# Patient Record
Sex: FEMALE | Race: WHITE | NOT HISPANIC OR LATINO | Employment: FULL TIME | URBAN - METROPOLITAN AREA
[De-identification: names, ages, dates, MRNs, and addresses within clinical notes are randomized per-mention and may not be internally consistent; named-entity substitution may affect disease eponyms.]

---

## 2018-06-08 ENCOUNTER — APPOINTMENT (EMERGENCY)
Dept: RADIOLOGY | Facility: HOSPITAL | Age: 48
End: 2018-06-08
Payer: COMMERCIAL

## 2018-06-08 ENCOUNTER — HOSPITAL ENCOUNTER (EMERGENCY)
Facility: HOSPITAL | Age: 48
Discharge: HOME | End: 2018-06-08
Admitting: EMERGENCY MEDICINE
Payer: COMMERCIAL

## 2018-06-08 VITALS
OXYGEN SATURATION: 100 % | SYSTOLIC BLOOD PRESSURE: 175 MMHG | RESPIRATION RATE: 18 BRPM | WEIGHT: 219.36 LBS | HEART RATE: 72 BPM | TEMPERATURE: 98.1 F | DIASTOLIC BLOOD PRESSURE: 88 MMHG

## 2018-06-08 DIAGNOSIS — S52.122A FRACTURE OF RADIAL HEAD, LEFT, CLOSED: ICD-10-CM

## 2018-06-08 DIAGNOSIS — M25.422 ELBOW EFFUSION, LEFT: Primary | ICD-10-CM

## 2018-06-08 PROCEDURE — 99283 EMERGENCY DEPT VISIT LOW MDM: CPT

## 2018-06-08 PROCEDURE — 73080 X-RAY EXAM OF ELBOW: CPT

## 2018-06-08 RX ORDER — NAPROXEN 500 MG/1
500 TABLET ORAL 2 TIMES DAILY WITH MEALS
Qty: 14 TABLET | Refills: 0 | Status: SHIPPED | OUTPATIENT
Start: 2018-06-08

## 2018-06-08 RX ORDER — TRAMADOL HYDROCHLORIDE 50 MG/1
50 TABLET ORAL EVERY 6 HOURS PRN
Qty: 12 TABLET | Refills: 0 | Status: SHIPPED | OUTPATIENT
Start: 2018-06-08 | End: 2018-06-20

## 2018-06-08 NOTE — ED NOTES
Sling applied by Madalynn Saint, ice pack refilled upon PT request      Fela Vieira RN  06/08/18 9258

## 2018-06-08 NOTE — DISCHARGE INSTRUCTIONS
Elbow Fracture   WHAT YOU NEED TO KNOW:   An elbow fracture is a break in one or more of the 3 bones that form your elbow joint  An elbow fracture is often caused by an injury  An example is a fall onto an outstretched hand with a bent elbow  Osteoporosis (brittle bones) can increase your risk for an elbow fracture  DISCHARGE INSTRUCTIONS:   Return to the emergency department if:   · Your skin becomes swollen, cold, or pale  · Your elbow, hand, or fingers are numb  Contact your healthcare provider if:   · You have a fever  · The pain gets worse, even after you rest and take your medicine  · You have new or more trouble moving your arm  · You have new sores around the area of your brace, splint, or cast     · Your brace, splint, or cast becomes damaged  · You have questions or concerns about your condition or care  Medicines: You may need any of the following:  · Prescription pain medicine  may be given  Ask your healthcare provider how to take this medicine safely  Some prescription pain medicines contain acetaminophen  Do not take other medicines that contain acetaminophen without talking to your healthcare provider  Too much acetaminophen may cause liver damage  Prescription pain medicine may cause constipation  Ask your healthcare provider how to prevent or treat constipation  · NSAIDs , such as ibuprofen, help decrease swelling, pain, and fever  This medicine is available with or without a doctor's order  NSAIDs can cause stomach bleeding or kidney problems in certain people  If you take blood thinner medicine, always ask your healthcare provider if NSAIDs are safe for you  Always read the medicine label and follow directions  · Take your medicine as directed  Contact your healthcare provider if you think your medicine is not helping or if you have side effects  Tell him or her if you are allergic to any medicine  Keep a list of the medicines, vitamins, and herbs you take   Include the amounts, and when and why you take them  Bring the list or the pill bottles to follow-up visits  Carry your medicine list with you in case of an emergency  Self-care:   · Elevate your elbow  above the level of your heart as often as you can  This will help decrease swelling and pain  Prop your elbow on pillows or blankets to keep it elevated comfortably  While your elbow is elevated, wiggle your fingers and open and close them to prevent hand stiffness  · Apply ice  on your elbow on your elbow for 15 to 20 minutes every hour or as directed  Use an ice pack, or put crushed ice in a plastic bag  Cover it with a towel  Ice helps prevent tissue damage and decreases swelling and pain  · Go to physical therapy as directed  A physical therapist can teach you exercises to help improve movement and strength and to decrease pain  Care for your brace, cast, or splint:  Follow instructions about when you may take a bath or shower  It is important not to get your brace, cast, or splint wet  Cover your device with a plastic bag before you bathe  Tape the bag to your skin above the device to help keep out water  Hold your elbow away from the water in case the bag breaks  · Check the skin around your cast, brace, or splint daily for any redness or open skin  · Do not use a sharp or pointed object to scratch your skin under the cast, brace, or splint  · Do not remove your brace or splint unless directed  Follow up with your healthcare provider as directed: You may need to see a specialist  Rafaela Blanco may need more x-rays and treatment  Write down your questions so you remember to ask them during your visits  © 2017 [employer] Information is for End User's use only and may not be sold, redistributed or otherwise used for commercial purposes  All illustrations and images included in CareNotes® are the copyrighted property of [employer]  or [employer]    The above information is an  only  It is not intended as medical advice for individual conditions or treatments  Talk to your doctor, nurse or pharmacist before following any medical regimen to see if it is safe and effective for you

## 2018-06-08 NOTE — ED PROVIDER NOTES
History  Chief Complaint   Patient presents with    Fall     Pt  s/p fall, slipped and fell and broke fall by falling onto left arm  Pt  now with pain from left upper arm,left elbow to wrist  Pt  took advill for pain at 915     Patient presents emergency room after slipping and falling onto both outstretched hands  She complains of pain over her left forearm and elbow  She states the pain is worse with the range of motion  She has no history of a previous injury  She denies any head or neck injury  She denies any numbness, tingling but does have weakness in her left arm secondary to pain and guarding  She denies any other injuries  Patient took ibuprofen prior to arrival   She has a past medical history that is negative  History provided by:  Patient  Fall   Mechanism of injury: fall    Injury location: left elbow  Incident location:  Home  Fall:     Fall occurred:  Standing (slipped anf fell landing on her left outsretched hand )    Impact surface:  Hard floor    Point of impact:  Hands  Suspicion of alcohol use: no    Suspicion of drug use: no    Prior to arrival data:     Loss of consciousness: no      Amnesic to event: no    Associated symptoms: no abdominal pain, no back pain, no chest pain, no headaches and no neck pain        None       History reviewed  No pertinent past medical history  History reviewed  No pertinent surgical history  History reviewed  No pertinent family history  I have reviewed and agree with the history as documented  Social History   Substance Use Topics    Smoking status: Never Smoker    Smokeless tobacco: Never Used    Alcohol use No      Comment: social        Review of Systems   Constitutional: Positive for activity change  Cardiovascular: Negative for chest pain  Gastrointestinal: Negative for abdominal pain  Musculoskeletal: Positive for arthralgias  Negative for back pain, gait problem, joint swelling, neck pain and neck stiffness  Neurological: Negative for headaches  Psychiatric/Behavioral: Negative for confusion  All other systems reviewed and are negative  Physical Exam  Physical Exam   Constitutional: She is oriented to person, place, and time  She appears well-developed and well-nourished  No distress  HENT:   Head: Normocephalic and atraumatic  Right Ear: External ear normal    Left Ear: External ear normal    Nose: Nose normal    Eyes: Conjunctivae are normal  Right eye exhibits no discharge  Left eye exhibits no discharge  Pulmonary/Chest: Effort normal    Musculoskeletal: She exhibits tenderness  She exhibits no deformity  Examination of the left elbow-it is atraumatic upon inspection  Patient is guarding her arm  There is tenderness palpated over the radial head that is increased with pronation supination of her forearm  The remainder of the forearm wrist and hand are nontender with full range of motion  There is no tenderness of the shoulder or proximal humerus area  There is no cervical spine tenderness  There are palpable pulses over the radial and ulnar arteries that are +2 and symmetrical at the wrist   Capillary refills less than 2 sec  Motor and sensation are intact to the median, Radial, ulnar, axillary aspects of the left upper extremity   Neurological: She is alert and oriented to person, place, and time  Skin: Skin is warm  Capillary refill takes less than 2 seconds  She is not diaphoretic  Psychiatric: She has a normal mood and affect  Her behavior is normal  Judgment and thought content normal    Nursing note and vitals reviewed        Vital Signs  ED Triage Vitals [06/08/18 1034]   Temperature Pulse Respirations Blood Pressure SpO2   98 1 °F (36 7 °C) 72 18 (!) 175/88 100 %      Temp Source Heart Rate Source Patient Position - Orthostatic VS BP Location FiO2 (%)   Oral Monitor Lying Right arm --      Pain Score       8           Vitals:    06/08/18 1034   BP: (!) 175/88   Pulse: 72   Patient Position - Orthostatic VS: Lying       Visual Acuity      ED Medications  Medications - No data to display    Diagnostic Studies  Results Reviewed     None                 XR elbow 3+ vw LEFT   ED Interpretation by Ashtyn Gutiérrez PA-C (06/08 1126)   Effusion, fracture radial head  Final Result by Marcin Salomon MD (06/08 1134)      Fracture of the proximal radius  Workstation performed: [id number]                    Procedures  Procedures       Phone Contacts  ED Phone Contact    ED Course                               MDM  Number of Diagnoses or Management Options  Elbow effusion, left: new and requires workup  Fracture of radial head, left, closed: new and requires workup     Amount and/or Complexity of Data Reviewed  Tests in the radiology section of CPT®: ordered and reviewed  Tests in the medicine section of CPT®: ordered and reviewed    Risk of Complications, Morbidity, and/or Mortality  Presenting problems: moderate  Diagnostic procedures: moderate  Management options: moderate  General comments: Patient presents emergency room after falling and landing on her left outstretched hand  She was seen and evaluated and diagnosed with x-ray examination with a nondisplaced radial head fracture  She had an anterior and posterior fusion in or elbow as well  She was placed in a sling for comfort    She was given pain medications and an orthopedic referral     Patient Progress  Patient progress: stable    CritCare Time    Disposition  Final diagnoses:   Elbow effusion, left   Fracture of radial head, left, closed     Time reflects when diagnosis was documented in both MDM as applicable and the Disposition within this note     Time User Action Codes Description Comment    6/8/2018 11:27 AM Oziel Gutierrez Add [M25 422] Elbow effusion, left     6/8/2018 11:27 AM Oziel Gutierrez Add [S52 122A] Fracture of radial head, left, closed       ED Disposition     ED Disposition Condition Comment Discharge  [facility]  Condition at discharge: Good        Follow-up Information     Follow up With Specialties Details Why Contact Info    Quan Lake MD Orthopedic Surgery Schedule an appointment as soon as possible for a visit in 3 days  [address] [place] [address] [address]  [address] [address]            Discharge Medication List as of 6/8/2018 11:30 AM      START taking these medications    Details   naproxen (NAPROSYN) 500 mg tablet Take 1 tablet (500 mg total) by mouth 2 (two) times a day with meals, Starting Fri 6/8/2018, Print      traMADol (ULTRAM) 50 mg tablet Take 1 tablet (50 mg total) by mouth every 6 (six) hours as needed for moderate pain for up to 12 days, Starting Fri 6/8/2018, Until Wed 6/20/2018, Print           No discharge procedures on file      ED Provider  Electronically Signed by           Tony Feliciano PA-C  06/08/18 4776

## 2018-06-08 NOTE — ED NOTES
PT awake and alert, no distress noted  No other questions upon d/c       April Ross Jackson RN  06/08/18 7913

## 2018-06-13 ENCOUNTER — OFFICE VISIT (OUTPATIENT)
Dept: OBGYN CLINIC | Facility: CLINIC | Age: 48
End: 2018-06-13
Payer: COMMERCIAL

## 2018-06-13 VITALS
DIASTOLIC BLOOD PRESSURE: 81 MMHG | BODY MASS INDEX: 37.56 KG/M2 | HEART RATE: 79 BPM | SYSTOLIC BLOOD PRESSURE: 124 MMHG | WEIGHT: 220 LBS | HEIGHT: 64 IN

## 2018-06-13 DIAGNOSIS — S52.125A CLOSED NONDISPLACED FRACTURE OF HEAD OF LEFT RADIUS, INITIAL ENCOUNTER: Primary | ICD-10-CM

## 2018-06-13 PROBLEM — G47.33 OSA (OBSTRUCTIVE SLEEP APNEA): Status: ACTIVE | Noted: 2018-02-23

## 2018-06-13 PROBLEM — E66.9 CLASS 2 OBESITY WITH BODY MASS INDEX (BMI) OF 37.0 TO 37.9 IN ADULT: Status: ACTIVE | Noted: 2018-03-05

## 2018-06-13 PROBLEM — K63.5 COLON POLYPS: Status: ACTIVE | Noted: 2018-06-13

## 2018-06-13 PROBLEM — R73.01 IMPAIRED FASTING GLUCOSE: Status: ACTIVE | Noted: 2018-06-13

## 2018-06-13 PROCEDURE — 99204 OFFICE O/P NEW MOD 45 MIN: CPT | Performed by: ORTHOPAEDIC SURGERY

## 2018-06-13 PROCEDURE — 24650 CLTX RDL HEAD/NCK FX WO MNPJ: CPT | Performed by: ORTHOPAEDIC SURGERY

## 2018-06-13 RX ORDER — FLUTICASONE PROPIONATE 50 MCG
SPRAY, SUSPENSION (ML) NASAL
COMMUNITY
Start: 2017-01-18

## 2018-06-13 RX ORDER — LISINOPRIL 20 MG/1
TABLET ORAL
COMMUNITY
Start: 2018-06-01

## 2018-06-13 RX ORDER — ESCITALOPRAM OXALATE 20 MG/1
TABLET ORAL
COMMUNITY
Start: 2018-03-21

## 2018-06-13 NOTE — PROGRESS NOTES
Patient Name:  Anne Marie Cadet  MRN:  [id number]    Assessment & Plan    Left radial head/neck fracture 6/8/18  1  Continue nonweightbearing left upper extremity  2  Continue sling use at all times for total of two weeks from the injury date  Wean from sling gradually for the following two weeks  3  Referral to physical therapy  4  Follow-up in three and half weeks for repeat evaluation with repeat x-rays of the left elbow      Chief Complaint    Left elbow injury      History of the Present Illness    [place] female, RHD,  reports to the office today for evaluation of her left elbow  Patient states she sustained a mechanical fall on 6/8/18 with outstretched arms  After the fall she noted significant left elbow pain swelling and bruising  She reported to the emergency department her left elbow x-rays revealed a radial head/neck fracture  She was placed in a slight that time  Today in the office she notes overall improvement with regards to her pain  She notes no pain at rest   She denies numbness and tingling  She does note swelling and stiffness in the hand and digits  No fevers or chills  Physical Exam    LMP 06/04/2018     Left elbow:  No gross deformity  Skin intact  No erythema  There is swelling and ecchymosis noted about the elbow with extension distally into the hand and digits  Tenderness to palpation radial head  No tenderness to palpation medial and lateral epicondyle and olecranon  Range of motion includes 100° of elbow flexion  She lacks approximately 45° of extension  Pronation and supination intact but limited by pain  Wrist and digital range of motion intact  Sensation intact median ulnar and radial nerves  2+ radial pulse    Constitutional:  Well-developed and well-nourished  Eyes:  Anicteric sclerae  Neck:  Supple  Lungs:  Unlabored breathing  Cardiovascular:  Capillary refill is less than 2 seconds  Skin:  Intact without erythema    Neurologic:  Sensation intact to light touch  Psychiatric:  Mood and affect are appropriate  Data Review    I have personally reviewed pertinent films in PACS, and my interpretation follows  X-rays left elbow performed 6/8/18 reveals an impacted nondisplaced fracture of the radial head/neck  Fracture / Dislocation Treatment  Date/Time: 6/13/2018 3:26 PM  Performed by: Martha Manual by: Glory Mosqueda   Injury location: elbow  Location details: left elbow  Injury type: fracture  Fracture type: radial head  Immobilization: sling            History reviewed  No pertinent past medical history  History reviewed  No pertinent surgical history  Allergies   Allergen Reactions    Codeine        Current Outpatient Prescriptions on File Prior to Visit   Medication Sig Dispense Refill    naproxen (NAPROSYN) 500 mg tablet Take 1 tablet (500 mg total) by mouth 2 (two) times a day with meals 14 tablet 0    traMADol (ULTRAM) 50 mg tablet Take 1 tablet (50 mg total) by mouth every 6 (six) hours as needed for moderate pain for up to 12 days 12 tablet 0     No current facility-administered medications on file prior to visit  Social History   Substance Use Topics    Smoking status: Never Smoker    Smokeless tobacco: Never Used    Alcohol use No      Comment: social       History reviewed  No pertinent family history  Review of Systems    General:  Negative for fever, lethargy/malaise, or night sweats  Eyes:  Negative for blurry vision or double vision  ENT:  Negative for hearing change, nasal discharge, or sore throat  Hematological:  Negative for bleeding problems or blood clots  Endocrine:  Negative for excessive thirst or temperature intolerance  Respiratory:  Negative for cough or wheezing  Cardiovascular:  Negative for chest pain, dyspnea on exertion, or palpitations  Gastrointestinal:  Negative for abdominal pain, diarrhea, or nausea/vomiting    Musculoskeletal:  As stated in the HPI and otherwise negative  Neurological:  Negative for confusion, headaches, or seizures  Psychological:  Negative for hallucinations or mood swings  Dermatological:  Negative for itching or rash        Scribe Attestation    I,:   Jermaine Valiente PA-C am acting as a scribe while in the presence of the attending physician :        I,:   June Penn MD personally performed the services described in this documentation    as scribed in my presence :

## 2018-06-25 ENCOUNTER — EVALUATION (OUTPATIENT)
Dept: PHYSICAL THERAPY | Facility: CLINIC | Age: 48
End: 2018-06-25
Payer: COMMERCIAL

## 2018-06-25 DIAGNOSIS — S52.125A CLOSED NONDISPLACED FRACTURE OF HEAD OF LEFT RADIUS, INITIAL ENCOUNTER: Primary | ICD-10-CM

## 2018-06-25 PROCEDURE — G8984 CARRY CURRENT STATUS: HCPCS | Performed by: PHYSICAL THERAPIST

## 2018-06-25 PROCEDURE — 97162 PT EVAL MOD COMPLEX 30 MIN: CPT | Performed by: PHYSICAL THERAPIST

## 2018-06-25 PROCEDURE — G8985 CARRY GOAL STATUS: HCPCS | Performed by: PHYSICAL THERAPIST

## 2018-06-25 NOTE — PROGRESS NOTES
Daily Note     Today's date: 2018  Patient name: Yarely Moore  : 1970  MRN: [id number]  Referring provider: Morales Mcfarlane MD  Dx:   Encounter Diagnosis   Name Primary?  Closed nondisplaced fracture of head of left radius, initial encounter Yes                  Subjective: See IE      Objective: See treatment diary below      Assessment: Tolerated session well with good tolerance to extension PROM/AAROM  Supination most painful      Plan: Continue with current plan  Goal is to return to work (typing quite a lot) and to active-cardio class      Precautions: NWB, radial head fracture , HTN    Daily Treatment Diary       Manuals          PROM          STM to lateral extensor mass                                         Exercise Diary          Pendulums 20x         AAROM hand assist elbow flex 10x :05         AAROM wrist flex/ext 2x10         AAROM supination light 5x          Shoulder flexion AROM 10x         Table slides-flex/abd          ER wand- in neutral pron/sup          Full fist          Pulleys if natasha                                                                                                                                  Modalities          CP prn

## 2018-06-25 NOTE — PROGRESS NOTES
PT Evaluation     Today's date: 2018  Patient name: Bruna Montes  : 1970  MRN: [id number]  Referring provider: Des Kingsley MD  Dx:   Encounter Diagnosis     ICD-10-CM    1  Closed nondisplaced fracture of head of left radius, initial encounter S52 125A Ambulatory referral to Physical Therapy                  Assessment  Impairments: abnormal or restricted ROM, abnormal movement, activity intolerance and pain with function    Assessment details: Bruna Montes is a pleasant 41 y o  female who presents with signs and symptoms correlating with referring diagnosis  No further referral appears necessary at this time based upon examination results  The patient's greatest concerns are returning to work and exercise activities  she presents with a movement impairment diagnosis of proximal radioulnar supination hypomobility  Which presents with decreased ROM, strength, myofascial tension and pain which is limiting her ability to lift, type, and perform exercise activities  Negative prognostic indicators:none  Positive prognostic indicators: good motivation  Please contact me if you have any further questions or recommendations  Thank you very much for the kind referral         Goals  STGs  1  Decrease pain by 30% in 2-4 weeks  2  Improve elbow ROM by 10 degrees in 2-4 weeks  3  Improve elbow/wrist strength by 1/3 grade in 2-4 weeks  4 Perform gym "active" exercises in 4 weeks s pain  LTGs  1  Decrease pain by 60% in 6-8 weeks  2  Improve lifting tolerance to #3 in 6-8 weeks  3  Perform job typing activities without pain in 6-8 weeks  4 Independent in HEP in 8 weeks      Plan  Patient would benefit from: skilled physical therapy  Planned therapy interventions: manual therapy, therapeutic training, stretching, strengthening, therapeutic activities, therapeutic exercise, patient education and activity modification  Frequency: 2x week  Duration in weeks: 8  Treatment plan discussed with: patient        Subjective Evaluation    History of Present Illness  Mechanism of injury: She states she was emptying the  and she slipped went face down and braced herself with both arms  She went to the ER and was diagnosed with radial head fracture  She denies any numbness or tingling  She states it does feel tender and feels better sling  She works for [employer] and is on short term disability  She does sleep with the sling  She enjoys working out doing HIIT classes     Pain  At best pain ratin  At worst pain ratin  Location: lateral elbow    Social Support  Lives with: spouse    Hand dominance: right      Diagnostic Tests  X-ray: abnormal  Patient Goals  Patient goals for therapy: decreased pain, increased motion and increased strength          Objective     Active Range of Motion     Left Elbow   Flexion: 98 degrees   Extension: 30 degrees     Right Elbow   Forearm supination: 80 degrees   Forearm pronation: 87 degrees     Additional Active Range of Motion Details    Wrist:  74 ext pain  65 flex pain  30 Ulnar dev pain  15 Radial dev  Wrist strength:   Flex: 3+/5 pain  Ext:3+/5 pain    Shoulder: Repeated flexion pain in elbow after 3 reps (~150)  Abd: ~150 pain  ER: painful when in supination    Palpation: TTP at supinator muscle belly    Passive Range of Motion     Left Elbow   Flexion: 100 degrees with pain  Extension: 42 degrees with pain  Forearm supination: 50 degrees with pain  Forearm pronation: 70 degrees

## 2018-06-27 ENCOUNTER — OFFICE VISIT (OUTPATIENT)
Dept: PHYSICAL THERAPY | Facility: CLINIC | Age: 48
End: 2018-06-27
Payer: COMMERCIAL

## 2018-06-27 DIAGNOSIS — S52.125A CLOSED NONDISPLACED FRACTURE OF HEAD OF LEFT RADIUS, INITIAL ENCOUNTER: Primary | ICD-10-CM

## 2018-06-27 PROCEDURE — 97112 NEUROMUSCULAR REEDUCATION: CPT

## 2018-06-27 PROCEDURE — 97110 THERAPEUTIC EXERCISES: CPT

## 2018-06-27 PROCEDURE — 97140 MANUAL THERAPY 1/> REGIONS: CPT

## 2018-06-27 NOTE — PROGRESS NOTES
Daily Note     Today's date: 2018  Patient name: Shahana Her  : 1970  MRN: [id number]  Referring provider: Divya Patten MD  Dx:   Encounter Diagnosis     ICD-10-CM    1  Closed nondisplaced fracture of head of left radius, initial encounter S52 125A                   Subjective: Patient states that she has been compliant with her HEP but feels moderate amount of pain when performing AAROM elbow flexion  Objective: See treatment diary below    Precautions: NWB, radial head fracture , HTN    Daily Treatment Diary       Manuals         PROM  10        STM to lateral extensor mass  5                                       Exercise Diary          Pendulums 20x 20x        AAROM hand assist elbow flex 10x :05 pain        AAROM wrist flex/ext  2x10        AAROM supination light  5x        Shoulder flexion AROM  NV        Table slides-flex/abd  :10x10         ER wand- in neutral pron/sup          Full fist x20 x20        Pulleys if natasha  2 min                                                                                                                                 Modalities          CP prn                        Assessment: Tolerated treatment well  Patient exhibited good technique with therapeutic exercises      Plan: Continue per plan of care

## 2018-07-02 ENCOUNTER — APPOINTMENT (OUTPATIENT)
Dept: PHYSICAL THERAPY | Facility: CLINIC | Age: 48
End: 2018-07-02
Payer: COMMERCIAL

## 2018-07-03 ENCOUNTER — TELEPHONE (OUTPATIENT)
Dept: OBGYN CLINIC | Facility: HOSPITAL | Age: 48
End: 2018-07-03

## 2018-07-03 ENCOUNTER — APPOINTMENT (OUTPATIENT)
Dept: RADIOLOGY | Facility: CLINIC | Age: 48
End: 2018-07-03
Payer: COMMERCIAL

## 2018-07-03 ENCOUNTER — OFFICE VISIT (OUTPATIENT)
Dept: OBGYN CLINIC | Facility: CLINIC | Age: 48
End: 2018-07-03

## 2018-07-03 VITALS
HEIGHT: 64 IN | BODY MASS INDEX: 37.56 KG/M2 | HEART RATE: 74 BPM | SYSTOLIC BLOOD PRESSURE: 136 MMHG | DIASTOLIC BLOOD PRESSURE: 84 MMHG | WEIGHT: 220 LBS

## 2018-07-03 DIAGNOSIS — M25.522 PAIN IN LEFT ELBOW: Primary | ICD-10-CM

## 2018-07-03 DIAGNOSIS — S52.125D CLOSED NONDISPLACED FRACTURE OF HEAD OF LEFT RADIUS WITH ROUTINE HEALING, SUBSEQUENT ENCOUNTER: ICD-10-CM

## 2018-07-03 DIAGNOSIS — M25.522 PAIN IN LEFT ELBOW: ICD-10-CM

## 2018-07-03 PROCEDURE — 99024 POSTOP FOLLOW-UP VISIT: CPT | Performed by: ORTHOPAEDIC SURGERY

## 2018-07-03 PROCEDURE — 73080 X-RAY EXAM OF ELBOW: CPT

## 2018-07-03 NOTE — PROGRESS NOTES
41 y o female presents to the office roughly 4 weeks status post left radial head/neck fracture on 06/08/2018 due to a fall  She has been progressing well with physical therapy  She is still working on extension  She has been compliant with her lifting restrictions and her sling  She has no new complaints or concerns today in the office  Review of Systems  Review of systems negative unless otherwise specified in HPI    Past Medical History  Past Medical History:   Diagnosis Date    Anxiety     Hypertension        Past Surgical History  No past surgical history on file  Current Medications  Current Outpatient Prescriptions on File Prior to Visit   Medication Sig Dispense Refill    Cholecalciferol 4000 units CAPS Take one tab by mouth daily for depression & Vit D deficiency      escitalopram (LEXAPRO) 20 mg tablet       fluticasone (FLONASE) 50 mcg/act nasal spray USE 2 SPRAYS IN EACH NOSTRIL ONCE DAILY      lisinopril (ZESTRIL) 20 mg tablet       naproxen (NAPROSYN) 500 mg tablet Take 1 tablet (500 mg total) by mouth 2 (two) times a day with meals 14 tablet 0     No current facility-administered medications on file prior to visit  Recent Labs (HCT,HGB,PT,INR,ESR,CRP,GLU,HgA1C)  No results found for: HCT, HGB, WBC, PT, INR, ESR, CRP, GLUCOSE, HGBA1C      Physical exam  · General: Awake, Alert, Oriented  · Eyes: Pupils equal, round and reactive to light  · Heart: regular rate and rhythm  · Lungs: No audible wheezing  · Abdomen: soft  Left elbow  · Tender to palpation globally  · 15 degrees from full extension  · Mild effusion  · No ecchymosis   · Skin is warm and well perfused  · Good capillary refill      Imaging  X-ray of left elbow was reviewed and shows routine healing of radial head fracture    Procedure  None    Assessment/Plan:   41 y  o female is 4 weeks status post left radial head fracture    · Transition out of sling  · Continue physical therapy  · Work note was provided to patient stating she can return to sedentary work with lifting restrictions and an emphasis on the importance of going to physical therapy  · Follow up 1 month

## 2018-07-03 NOTE — LETTER
July 3, 2018     Patient: Emanuel Perez   YOB: 1970   Date of Visit: 7/3/2018       To Whom it May Concern:    Emanuel Perez is under my professional care  She was seen in my office on 7/3/2018  She is cleared to return to work modified duty from 7/3/18 until 8/3/18:  Sedentary duty with maximum lifting 2 pounds with the left upper extremity  Please allow patient to leave work to attend physical therapy 2-3 times a week, which is critical for restoring optimal range of motion and function in the injured extremity  If you have any questions or concerns, please don't hesitate to call           Sincerely,          Ian Valdez MD

## 2018-07-03 NOTE — TELEPHONE ENCOUNTER
Patient sees Dr Caryle Pais   [phone]    Patient is calling in reference to her return to work note  Patient is stating that her employer is not accommodating at all & she works about an hour & 20 minutes to get to pt from work  Patient is concerned that they will not allow this  Patient is wondering if she could return to work on 7/23/18 instead of 7/3/18 so that she can continue her pt

## 2018-07-05 ENCOUNTER — OFFICE VISIT (OUTPATIENT)
Dept: PHYSICAL THERAPY | Facility: CLINIC | Age: 48
End: 2018-07-05
Payer: COMMERCIAL

## 2018-07-05 DIAGNOSIS — S52.125A CLOSED NONDISPLACED FRACTURE OF HEAD OF LEFT RADIUS, INITIAL ENCOUNTER: Primary | ICD-10-CM

## 2018-07-05 PROCEDURE — 97110 THERAPEUTIC EXERCISES: CPT | Performed by: PHYSICAL THERAPIST

## 2018-07-05 NOTE — PROGRESS NOTES
Daily Note     Today's date: 2018  Patient name: Ifrah Ibrahim  : 1970  MRN: [id number]  Referring provider: Quiana Dixon MD  Dx:   Encounter Diagnosis     ICD-10-CM    1  Closed nondisplaced fracture of head of left radius, initial encounter S52 125A                   Subjective: Patient states she got back to her active class without using LUE    Objective: See treatment diary below     Precautions: NWB, #1 lifting radial head fracture , HTN    Daily Treatment Diary       Manuals  7       PROM  10 10'       STM to lateral extensor mass  5 3'                                      Exercise Diary          Pendulums 20x 20x 20x       AAROM hand assist elbow flex 10x :05 pain 10x       AAROM wrist flex/ext  2x10 2x10       AAROM supination light  5x 10x       Shoulder flexion AROM  NV 2x10       Table slides-flex/abd  :10x10  :10x10       ER wand- in neutral pron/sup   :05 x10       Full fist x20 x20 20x       Pulleys if natasha  2 min  3'       Elbow flex #1          AROM shoulder abd   2x10                                                                                                           Modalities          CP prn   10'                     Assessment: Tolerated treatment well  Patient exhibited good technique with therapeutic exercises  Reached out to MD for updated precautions and he stated NWB status still and she is only allowed to lift #1  Add in nv  Pain with end ROM elbow ext/flex  Plan: Continue per plan of care  Goal is to RTW on  as well as kayaking

## 2018-07-06 ENCOUNTER — OFFICE VISIT (OUTPATIENT)
Dept: PHYSICAL THERAPY | Facility: CLINIC | Age: 48
End: 2018-07-06
Payer: COMMERCIAL

## 2018-07-06 DIAGNOSIS — S52.125A CLOSED NONDISPLACED FRACTURE OF HEAD OF LEFT RADIUS, INITIAL ENCOUNTER: Primary | ICD-10-CM

## 2018-07-06 PROCEDURE — 97140 MANUAL THERAPY 1/> REGIONS: CPT | Performed by: PHYSICAL THERAPIST

## 2018-07-06 PROCEDURE — 97110 THERAPEUTIC EXERCISES: CPT | Performed by: PHYSICAL THERAPIST

## 2018-07-09 NOTE — TELEPHONE ENCOUNTER
I called and spoke to Rafael Garcia and let her know that the work note is completed  She will pick it up at the office

## 2018-07-10 ENCOUNTER — OFFICE VISIT (OUTPATIENT)
Dept: PHYSICAL THERAPY | Facility: CLINIC | Age: 48
End: 2018-07-10
Payer: COMMERCIAL

## 2018-07-10 DIAGNOSIS — S52.125A CLOSED NONDISPLACED FRACTURE OF HEAD OF LEFT RADIUS, INITIAL ENCOUNTER: Primary | ICD-10-CM

## 2018-07-10 PROCEDURE — 97110 THERAPEUTIC EXERCISES: CPT | Performed by: PHYSICAL THERAPIST

## 2018-07-10 NOTE — PROGRESS NOTES
Daily Note     Today's date: 7/10/2018  Patient name: Juan Rodriguez  : 1970  MRN: [id number]  Referring provider: Som Srivastava MD  Dx:   Encounter Diagnosis     ICD-10-CM    1  Closed nondisplaced fracture of head of left radius, initial encounter S52 125A                   Subjective: Pt presents today stating a little tight but otherwise feeling pretty well  Objective: See treatment diary below     Precautions: NWB, #1 lifting radial head fracture , HTN     Daily Treatment Diary       Manuals  7/10     PROM  10 10' 5 min 5'     STM to lateral extensor mass  5 3' 5 mins 7''                                    Exercise Diary          Pendulums 20x 20x 20x 20x np     AROM  elbow flex 10x :05 pain 10x 10x  2x10     AROM wrist flex/ext  2x10 2x10 2 x 10 2x10     AROM supination   5x 10x 10x  2x10     Shoulder flexion AROM  NV 2x10 2 x 10 2x10     Table slides-flex/abd  :10x10  :10x10 10" x 10 10x :10     ER wand- in neutral pron/sup   :05 x10 5" x 10 5x :10     Full, hook,  fist x20 x20 20x 20x  20x     Pulleys if natasha  2 min  3'  3 mins 3'      Elbow flex #1    1# x 20 #1 2x10 painful     AROM shoulder abd   2x10 1# x 20 #1 x20     Prone Ys/Ts     2x10     Russian twists     2x20                                                                                     Modalities          CP prn   10'  10'                   Assessment: She tolerated all TE well other than end range flexion and extension ROM      Plan: Continue per plan of care

## 2018-07-12 ENCOUNTER — OFFICE VISIT (OUTPATIENT)
Dept: PHYSICAL THERAPY | Facility: CLINIC | Age: 48
End: 2018-07-12
Payer: COMMERCIAL

## 2018-07-12 DIAGNOSIS — S52.125A CLOSED NONDISPLACED FRACTURE OF HEAD OF LEFT RADIUS, INITIAL ENCOUNTER: Primary | ICD-10-CM

## 2018-07-12 PROCEDURE — 97140 MANUAL THERAPY 1/> REGIONS: CPT | Performed by: PHYSICAL THERAPIST

## 2018-07-12 PROCEDURE — 97110 THERAPEUTIC EXERCISES: CPT | Performed by: PHYSICAL THERAPIST

## 2018-07-12 NOTE — PROGRESS NOTES
Daily Note     Today's date: 2018  Patient name: Debra Roman  : 1970  MRN: [id number]  Referring provider: Jose Delcid MD  Dx:   Encounter Diagnosis     ICD-10-CM    1  Closed nondisplaced fracture of head of left radius, initial encounter S52 125A                   Subjective: Pt presents today stating a little tight but otherwise feeling pretty well  Objective: See treatment diary below     Precautions: NWB, #1 lifting radial head fracture , HTN     Daily Treatment Diary       Manuals 6/25 6/27 7/5 7/7 7/10 7/12    PROM  10 10' 5 min 5' 5'    STM to lateral extensor mass  5 3' 5 mins 7'' 5'    Resisted L scap retraction/depression      5'                         Exercise Diary          Pendulums 20x 20x 20x 20x np 20x    AROM  elbow flex 10x :05 pain 10x 10x  2x10 2x10    Gripping putty- nv          AROM supination   5x 10x 10x  2x10 2x10    Shoulder flexion AROM  NV 2x10 2 x 10 2x10 2x10     Table slides-flex/abd  :10x10  :10x10 10" x 10 10x :10     ER wand- in neutral pron/sup   :05 x10 5" x 10 5x :10     Full, hook,  fist x20 x20 20x 20x  20x 20x    Pulleys if natasha  2 min  3'  3 mins 3'  3'    Elbow flex #1    1# x 20 #1 2x10 painful     AROM shoulder abd   2x10 1# x 20 #1 x20 #1 x20    Prone Ys/Ts     2x10 2x10    Russian twists     2x20 2x20    TB wall walks                                                                                Modalities          CP prn   10'  10' 10'                  Assessment: She still noticed clicking in shoulder but was made better after scapular strengthening  Still lacks elbow end range extension  Plan: Continue per plan of care

## 2018-07-15 NOTE — PROGRESS NOTES
Daily Note     Today's date: 2018  Patient name: Jai Hendrickson  : 1970  MRN: [id number]  Referring provider: Zulay Booker MD  Dx:   Encounter Diagnosis     ICD-10-CM    1  Closed nondisplaced fracture of head of left radius, initial encounter S52 125A                   Subjective: Pt presents today stating a little tight but otherwise feeling pretty well  Objective: See treatment diary below     Precautions: NWB, #1 lifting radial head fracture , HTN     Daily Treatment Diary       Manuals 6/25 6/27 7/5 7/7 7/10 7/12 7/17   PROM  10 10' 5 min 5' 5' 5'   STM to lateral extensor mass  5 3' 5 mins 7'' 5' 5'   Resisted L scap retraction/depression      5' np                        Exercise Diary          Pendulums 20x 20x 20x 20x np 20x    AROM  elbow flex 10x :05 pain 10x 10x  2x10 2x10 2x10   Gripping putty- nv          AROM supination   5x 10x 10x  2x10 2x10 2x10   Shoulder flexion AROM  NV 2x10 2 x 10 2x10 2x10  2x10 #1   Table slides-flex/abd  :10x10  :10x10 10" x 10 10x :10     ER wand- in neutral pron/sup   :05 x10 5" x 10 5x :10     Full, hook,  fist x20 x20 20x 20x  20x 20x 20x   Pulleys if natasha  2 min  3'  3 mins 3'  3' 3'/3'   Elbow flex AROM    1# x 20 #1 2x10 painful  2x10   AROM shoulder abd   2x10 1# x 20 #1 x20 #1 x20 #1 x20   Prone Ys/Ts     2x10 2x10 2x10   Russian twists     2x20 2x20 3x 20   TB wall walks       7x                                                                         Modalities          CP prn   10'  10' 10'                  Assessment: Still lacks elbow end range extension, flexion PROM had some resistance to stretch  Plan: Continue per plan of care  Will go to 1x/week until she follows up with MD on

## 2018-07-17 ENCOUNTER — OFFICE VISIT (OUTPATIENT)
Dept: PHYSICAL THERAPY | Facility: CLINIC | Age: 48
End: 2018-07-17
Payer: COMMERCIAL

## 2018-07-17 DIAGNOSIS — S52.125A CLOSED NONDISPLACED FRACTURE OF HEAD OF LEFT RADIUS, INITIAL ENCOUNTER: Primary | ICD-10-CM

## 2018-07-17 PROCEDURE — 97110 THERAPEUTIC EXERCISES: CPT | Performed by: PHYSICAL THERAPIST

## 2018-07-17 PROCEDURE — 97140 MANUAL THERAPY 1/> REGIONS: CPT | Performed by: PHYSICAL THERAPIST

## 2018-07-19 ENCOUNTER — APPOINTMENT (OUTPATIENT)
Dept: PHYSICAL THERAPY | Facility: CLINIC | Age: 48
End: 2018-07-19
Payer: COMMERCIAL

## 2018-07-23 NOTE — PROGRESS NOTES
PT Re-Evaluation     Today's date: 2018  Patient name: Elizabeth Fierro  : 1970  MRN: [id number]  Referring provider: Leopold Mura, MD  Dx:   No diagnosis found  Assessment  Impairments: abnormal or restricted ROM, abnormal movement, activity intolerance and pain with function    Assessment details: Dimple Vigil presents with great improvements of ROM and elbow strength  She does have residual pain at end ROM flex/ext/supination  She has been performing exercises at gym with WB activities such as modified pushups which she was cautioned against but has been improving greatly s pain  She still does have decreased ext ROM, ext strength, shoulder flex strength myofascial tension and pain which is limiting her ability to perform her exercise activities and lifting at home which include kayaking and lifting gallons of water  Thank you very much for the kind referral         Goals  STGs  1  Decrease pain by 30% in 2-4 weeks  -met  2  Improve elbow ROM to full in 2-4 weeks  -met   3  Improve elbow/wrist strength by 1/3 grade in 2-4 weeks  -met  4  Perform gym "active" exercises in 4 weeks s pain -met    LTGs  1  Decrease pain by 60% in 6-8 weeks  2  Improve lifting tolerance to #3 in 6-8 weeks  -partially met  3  Perform job typing activities without pain in 6-8 weeks  -met   4  Independent in HEP in 8 weeks  Plan  Patient would benefit from: skilled physical therapy  Planned therapy interventions: manual therapy, therapeutic training, stretching, strengthening, therapeutic activities, therapeutic exercise, patient education and activity modification  Frequency: 2x week  Duration in weeks: 8  Treatment plan discussed with: patient        Subjective Evaluation    History of Present Illness  Mechanism of injury: She states she has been doing a lot better  She is still having difficulty and pain with cleans  #10 and modified pushups  She has gotten back to work and 4x/week at exercise classes  Pain  No pain reported  At best pain ratin  At worst pain ratin  Location: posterior elbow    Social Support  Lives with: spouse    Hand dominance: right      Diagnostic Tests  X-ray: abnormal  Patient Goals  Patient goals for therapy: decreased pain, increased motion and increased strength          Objective     Active Range of Motion     Left Elbow   Flexion: 135 (tightness) degrees with pain  Extension: 2 (clicking) degrees with pain    Right Elbow   Forearm supination: 80 degrees   Forearm pronation: 87 degrees     Additional Active Range of Motion Details    Wrist:  ROM WNL    Wrist strength:   Flex: 4+/5   Ext:4+/5     Shoulder: Repeated flexion 170  Abd: 170   ER: WNL  ER strength:     Palpation: TTP at supinator muscle belly    Passive Range of Motion     Left Elbow   Flexion: 135 degrees with pain  Extension: 2 degrees

## 2018-07-23 NOTE — PROGRESS NOTES
Daily Note     Today's date: 2018  Patient name: Christelle Santiago  : 1970  MRN: [id number]  Referring provider: aDve Saenz MD  Dx:   No diagnosis found  Subjective: Pt presents today stating a little tight but otherwise feeling pretty well  Objective: See treatment diary below     Precautions: NWB, #1 lifting radial head fracture , HTN     Daily Treatment Diary       Manuals 6/25 6/27 7/5 7/7 7/10 7/12 7/17   PROM  10 10' 5 min 5' 5' 5'   STM to lateral extensor mass  5 3' 5 mins 7'' 5' 5'   Resisted L scap retraction/depression      5' np                        Exercise Diary          Pendulums 20x 20x 20x 20x np 20x    AROM  elbow flex 10x :05 pain 10x 10x  2x10 2x10 2x10   Gripping putty- nv          AROM supination   5x 10x 10x  2x10 2x10 2x10   Shoulder flexion AROM  NV 2x10 2 x 10 2x10 2x10  2x10 #1   Table slides-flex/abd  :10x10  :10x10 10" x 10 10x :10     ER wand- in neutral pron/sup   :05 x10 5" x 10 5x :10     Full, hook,  fist x20 x20 20x 20x  20x 20x 20x   Pulleys if natasha  2 min  3'  3 mins 3'  3' 3'/3'   Elbow flex AROM    1# x 20 #1 2x10 painful  2x10   AROM shoulder abd   2x10 1# x 20 #1 x20 #1 x20 #1 x20   Prone Ys/Ts     2x10 2x10 2x10   Russian twists     2x20 2x20 3x 20   TB wall walks       7x                                                                         Modalities          CP prn   10'  10' 10'                  Assessment: Still lacks elbow end range extension, flexion PROM had some resistance to stretch  Plan: Continue per plan of care  Will go to 1x/week until she follows up with MD on

## 2018-07-24 ENCOUNTER — APPOINTMENT (OUTPATIENT)
Dept: PHYSICAL THERAPY | Facility: CLINIC | Age: 48
End: 2018-07-24
Payer: COMMERCIAL

## 2018-07-26 ENCOUNTER — EVALUATION (OUTPATIENT)
Dept: PHYSICAL THERAPY | Facility: CLINIC | Age: 48
End: 2018-07-26
Payer: COMMERCIAL

## 2018-07-26 DIAGNOSIS — S52.125A CLOSED NONDISPLACED FRACTURE OF HEAD OF LEFT RADIUS, INITIAL ENCOUNTER: Primary | ICD-10-CM

## 2018-07-26 PROCEDURE — G8985 CARRY GOAL STATUS: HCPCS | Performed by: PHYSICAL THERAPIST

## 2018-07-26 PROCEDURE — 97110 THERAPEUTIC EXERCISES: CPT | Performed by: PHYSICAL THERAPIST

## 2018-07-26 PROCEDURE — 97140 MANUAL THERAPY 1/> REGIONS: CPT | Performed by: PHYSICAL THERAPIST

## 2018-07-26 PROCEDURE — G8984 CARRY CURRENT STATUS: HCPCS | Performed by: PHYSICAL THERAPIST

## 2018-07-26 NOTE — PROGRESS NOTES
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  Objective: See treatment diary below      Assessment: Refer to RE performed by PT WA performed from [id number]        Precautions: NWB, #1 lifting radial head fracture , HTN     Daily Treatment Diary       Manuals 7/26 6/27 7/5 7/7 7/10 7/12 7/17   PROM 5 mins + AP Ulnar Mob grade 2-4 10 10' 5 min 5' 5' 5'   STM to lateral extensor mass 5 mins 5 3' 5 mins 7'' 5' 5'   Resisted L scap retraction/depression      5' np                        Exercise Diary          Pendulums  20x 20x 20x np 20x    AROM  elbow flex 20x :05 pain 10x 10x  2x10 2x10 2x10   Gripping putty- nv          AROM supination  3 x 10 5x 10x 10x  2x10 2x10 2x10   Shoulder flexion AROM 3 x 10 NV 2x10 2 x 10 2x10 2x10  2x10 #1   Table slides-flex/abd  :10x10  :10x10 10" x 10 10x :10     ER wand- in neutral pron/sup   :05 x10 5" x 10 5x :10     Full, hook,  fist x20 x20 20x 20x  20x 20x 20x   Pulleys if natasha 3/3 2 min  3'  3 mins 3'  3' 3'/3'   Elbow flex AROM 3x10   1# x 20 #1 2x10 painful  2x10   AROM shoulder abd 3 x 10 1#  2x10 1# x 20 #1 x20 #1 x20 #1 x20   Prone Ys/Ts 3 x 10    2x10 2x10 2x10   Russian twists 3 x 20     2x20 2x20 3x 20   TB wall walks GTB 10x BTB     7x                                                                         Modalities          CP prn   10'  10' 10'

## 2018-07-31 ENCOUNTER — APPOINTMENT (OUTPATIENT)
Dept: RADIOLOGY | Facility: CLINIC | Age: 48
End: 2018-07-31
Payer: COMMERCIAL

## 2018-07-31 ENCOUNTER — OFFICE VISIT (OUTPATIENT)
Dept: OBGYN CLINIC | Facility: CLINIC | Age: 48
End: 2018-07-31

## 2018-07-31 VITALS
SYSTOLIC BLOOD PRESSURE: 115 MMHG | BODY MASS INDEX: 37.9 KG/M2 | HEART RATE: 84 BPM | DIASTOLIC BLOOD PRESSURE: 71 MMHG | WEIGHT: 222 LBS | HEIGHT: 64 IN

## 2018-07-31 DIAGNOSIS — S52.125D CLOSED NONDISPLACED FRACTURE OF HEAD OF LEFT RADIUS WITH ROUTINE HEALING, SUBSEQUENT ENCOUNTER: ICD-10-CM

## 2018-07-31 DIAGNOSIS — S52.125D CLOSED NONDISPLACED FRACTURE OF HEAD OF LEFT RADIUS WITH ROUTINE HEALING, SUBSEQUENT ENCOUNTER: Primary | ICD-10-CM

## 2018-07-31 PROCEDURE — 73080 X-RAY EXAM OF ELBOW: CPT

## 2018-07-31 PROCEDURE — 99024 POSTOP FOLLOW-UP VISIT: CPT | Performed by: ORTHOPAEDIC SURGERY

## 2018-07-31 RX ORDER — TRAZODONE HYDROCHLORIDE 50 MG/1
TABLET ORAL
COMMUNITY
Start: 2018-07-12

## 2018-07-31 NOTE — PROGRESS NOTES
Patient Name:  David Pendleton  MRN:  [id number]    Assessment & Plan    Left radial head/neck fracture 6/8/18  1  Continue physical therapy, HEP as appropriate  2  Activities as tolerated, no restrictions  3  Follow-up as needed      Subjective    42-year-old female returns to the office today status post Left radial head/neck fracture 6/8/18  Today she denies any pain  She does note mild persistent weakness which is improving with physical therapy  She denies any stiffness or swelling  She has returned to work without difficulty  No fevers or chills  Objective    /71   Pulse 84   Ht 5' 4" (1 626 m)   Wt 101 kg (222 lb)   BMI 38 11 kg/m²     Left elbow:  No gross deformity  Skin intact  No erythema ecchymosis or swelling  Minimal discomfort to palpation radial head  No tenderness to palpation olecranon  Full elbow range of motion without discomfort  Stable to varus and valgus stress  Full pronation and supination without discomfort  Neurovascularly intact left upper extremity  2+ radial pulse  Data Review    I have personally reviewed pertinent films in PACS, and my interpretation follows      X-rays performed today of the left elbow reveals a healed radial head/neck fracture      Scribe Attestation    I,:   Maria Fernanda Leal PA-C am acting as a scribe while in the presence of the attending physician :        I,:   Memo Gordillo MD personally performed the services described in this documentation    as scribed in my presence :

## 2018-08-02 ENCOUNTER — OFFICE VISIT (OUTPATIENT)
Dept: PHYSICAL THERAPY | Facility: CLINIC | Age: 48
End: 2018-08-02
Payer: COMMERCIAL

## 2018-08-02 DIAGNOSIS — S52.125A CLOSED NONDISPLACED FRACTURE OF HEAD OF LEFT RADIUS, INITIAL ENCOUNTER: Primary | ICD-10-CM

## 2018-08-02 PROCEDURE — G8985 CARRY GOAL STATUS: HCPCS | Performed by: PHYSICAL THERAPIST

## 2018-08-02 PROCEDURE — G8986 CARRY D/C STATUS: HCPCS | Performed by: PHYSICAL THERAPIST

## 2018-08-02 PROCEDURE — 97110 THERAPEUTIC EXERCISES: CPT | Performed by: PHYSICAL THERAPIST

## 2018-08-02 PROCEDURE — 97140 MANUAL THERAPY 1/> REGIONS: CPT | Performed by: PHYSICAL THERAPIST

## 2018-08-02 NOTE — PROGRESS NOTES
Daily Note     Today's date: 2018  Patient name: Nissa Mendoza  : 1970  MRN: [id number]  Referring provider: Hector Gorman MD  Dx:   Encounter Diagnosis     ICD-10-CM    1  Closed nondisplaced fracture of head of left radius, initial encounter S52 125A                   Subjective: Pt reports back from MD and is cleared to return to [facility]  Objective: See treatment diary below      Assessment: Refer to RE performed by PT WA performed from [id number]      Plan: Patient will be d/c to HEP    Precautions: radial head fracture , HTN     Daily Treatment Diary       Manuals 7/26 8/2 7/5 7/7 7/10 7/12 7/17   PROM 5 mins + AP Ulnar Mob grade 2-4  10' 5 min 5' 5' 5'   STM to lateral extensor mass 5 mins 5 3' 5 mins 7'' 5' 5'   Resisted L scap retraction/depression      5' np   Rib 1 L inferior mobs  8'         assessed thoracic glides  2'        Exercise Diary          Pendulums  20x 20x 20x np 20x    AROM  elbow flex 20x :05 pain 10x 10x  2x10 2x10 2x10   Gripping putty- nv          AROM supination  3 x 10 5x 10x 10x  2x10 2x10 2x10   Shoulder flexion AROM 3 x 10 3x10 #1 2x10 2 x 10 2x10 2x10  2x10 #1   Table slides-flex/abd  :10x10  :10x10 10" x 10 10x :10     ER wand- in neutral pron/sup   :05 x10 5" x 10 5x :10     Full, hook,  fist x20 x20 20x 20x  20x 20x 20x   Pulleys if natasha 3/3   3'  3 mins 3'  3' 3'/3'   Elbow flex AROM 3x10   1# x 20 #1 2x10 painful  2x10   AROM shoulder abd 3 x 10 1# 3x10 #2 2x10 1# x 20 #1 x20 #1 x20 #1 x20   Prone Ys/Ts 3 x 10 10x   2x10 2x10 2x10   Russian twists 3 x 20     2x20 2x20 3x 20   TB wall walks GTB 10x BTB     7x   Body blade  4x :20        Shoulder press #2  2x10                                                          Modalities          CP prn   10'  10' 10'

## 2018-08-07 ENCOUNTER — APPOINTMENT (OUTPATIENT)
Dept: PHYSICAL THERAPY | Facility: CLINIC | Age: 48
End: 2018-08-07
Payer: COMMERCIAL

## 2018-08-08 ENCOUNTER — APPOINTMENT (OUTPATIENT)
Dept: PHYSICAL THERAPY | Facility: CLINIC | Age: 48
End: 2018-08-08
Payer: COMMERCIAL

## 2018-08-14 ENCOUNTER — APPOINTMENT (OUTPATIENT)
Dept: PHYSICAL THERAPY | Facility: CLINIC | Age: 48
End: 2018-08-14
Payer: COMMERCIAL

## 2018-08-15 ENCOUNTER — APPOINTMENT (OUTPATIENT)
Dept: PHYSICAL THERAPY | Facility: CLINIC | Age: 48
End: 2018-08-15
Payer: COMMERCIAL

## 2018-08-16 NOTE — PROGRESS NOTES
Emanuel Perez has made great functional progress with physical therapy  she was educated and updated in her home exercise program  Jose Daniel Garcia will be discharged from formal therapy due to return to full activities but will follow up as needed

## 2018-08-21 ENCOUNTER — APPOINTMENT (OUTPATIENT)
Dept: PHYSICAL THERAPY | Facility: CLINIC | Age: 48
End: 2018-08-21
Payer: COMMERCIAL

## 2018-08-22 ENCOUNTER — APPOINTMENT (OUTPATIENT)
Dept: PHYSICAL THERAPY | Facility: CLINIC | Age: 48
End: 2018-08-22
Payer: COMMERCIAL

## 2021-01-21 DIAGNOSIS — Z23 ENCOUNTER FOR IMMUNIZATION: ICD-10-CM
